# Patient Record
Sex: FEMALE | Race: WHITE | NOT HISPANIC OR LATINO | Employment: FULL TIME | URBAN - METROPOLITAN AREA
[De-identification: names, ages, dates, MRNs, and addresses within clinical notes are randomized per-mention and may not be internally consistent; named-entity substitution may affect disease eponyms.]

---

## 2022-07-18 ENCOUNTER — OFFICE VISIT (OUTPATIENT)
Dept: URGENT CARE | Facility: CLINIC | Age: 22
End: 2022-07-18
Payer: COMMERCIAL

## 2022-07-18 VITALS
TEMPERATURE: 97.9 F | SYSTOLIC BLOOD PRESSURE: 145 MMHG | HEART RATE: 102 BPM | WEIGHT: 135 LBS | WEIGHT: 135 LBS | BODY MASS INDEX: 21.69 KG/M2 | SYSTOLIC BLOOD PRESSURE: 145 MMHG | HEIGHT: 66 IN | HEIGHT: 66 IN | OXYGEN SATURATION: 99 % | BODY MASS INDEX: 21.69 KG/M2 | TEMPERATURE: 97.9 F | DIASTOLIC BLOOD PRESSURE: 103 MMHG | DIASTOLIC BLOOD PRESSURE: 103 MMHG | OXYGEN SATURATION: 99 % | HEART RATE: 102 BPM

## 2022-07-18 DIAGNOSIS — L30.9 ECZEMA, UNSPECIFIED TYPE: Primary | ICD-10-CM

## 2022-07-18 PROCEDURE — 99213 OFFICE O/P EST LOW 20 MIN: CPT | Performed by: EMERGENCY MEDICINE

## 2022-07-18 RX ORDER — PREDNISONE 10 MG/1
TABLET ORAL
Qty: 27 TABLET | Refills: 0 | Status: SHIPPED | OUTPATIENT
Start: 2022-07-18

## 2022-07-18 RX ORDER — LISDEXAMFETAMINE DIMESYLATE 40 MG/1
CAPSULE ORAL
COMMUNITY
Start: 2022-06-29

## 2022-07-18 NOTE — PROGRESS NOTES
[facility]        NAME: Yaneli Ruelas is a 25 y o  female  : 2000    MRN: [id number]  DATE: 2022  TIME: 6:07 PM    Assessment and Plan   Eczema, unspecified type [L30 9]  1  Eczema, unspecified type  predniSONE 10 mg tablet         Patient Instructions     Patient Instructions     Eczema   WHAT YOU NEED TO KNOW:   Eczema is an itchy, red skin rash  You are more likely to have it if your parent or a family member has eczema, asthma, or hay fever  Eczema is a long-term condition  You may have flare-ups from time to time for the rest of your life  DISCHARGE INSTRUCTIONS:   Return to the emergency department if:   · You develop a fever or have red streaks going up your arm or leg  · Your rash gets more swollen, red, or hot  Call your doctor if:   · Most of your skin is red, swollen, painful, and covered with scales  · You develop bloody, red, painful crusts  · Your skin blisters and oozes white or yellow pus  · You have questions or concerns about your condition or care  Medicines:   · Medicines may help reduce itching, redness, pain, and swelling  They may be given as a cream or pill  You may also receive antibiotics if you have a skin infection  · Take your medicine as directed  Contact your healthcare provider if you think your medicine is not helping or if you have side effects  Tell him or her if you are allergic to any medicine  Keep a list of the medicines, vitamins, and herbs you take  Include the amounts, and when and why you take them  Bring the list or the pill bottles to follow-up visits  Carry your medicine list with you in case of an emergency  Care for your skin:   · Do not scratch  Pat or press on your skin to relieve itching  Your symptoms will get worse if you scratch  Keep your fingernails short so you do not tear your skin if you do scratch  · Keep your skin moist   Rub lotion, cream, or ointment into your skin at least 2 times a day   Ask your healthcare provider what to use and how often to use it  · Take baths or showers  with warm water for 10 minutes or less  Use mild bar soap  Ask your healthcare provider for the best soap for you to use  · Wear cotton clothes  Wear loose-fitting clothes made from cotton or cotton blends  Avoid wool  · Use a humidifier  to add moisture to the air in your home  · Avoid changes in temperature , especially activities that cause you to sweat a lot  Sweat can cause itching  Remove blankets from your bed if you get hot while you sleep  · Avoid allergens, dust, and skin irritants  Do not use perfume, fabric softener, or makeup that burns or itches  Follow up with your doctor as directed:  Write down your questions so you remember to ask them during your visits  © [employer] 2022 Information is for End User's use only and may not be sold, redistributed or otherwise used for commercial purposes  All illustrations and images included in CareNotes® are the copyrighted property of [employer] D A M [employer]  or [facility] Niko Kapadia   The above information is an  only  It is not intended as medical advice for individual conditions or treatments  Talk to your doctor, nurse or pharmacist before following any medical regimen to see if it is safe and effective for you  Follow up with PCP in 3-5 days  Proceed to  ER if symptoms worsen  Chief Complaint     Chief Complaint   Patient presents with    Rash     Her Rash is spreading on her hands 1 week ago and now spreading to her face          History of Present Illness       Patient complains of eczema flare for the past week  History of eczema since childhood  No relief with oral antihistamines or corticosteroid creams  Review of Systems   Review of Systems   Constitutional: Negative for chills and fever  Respiratory: Negative for shortness of breath      Musculoskeletal: Negative for arthralgias, joint swelling, myalgias, neck pain and neck stiffness  Skin: Positive for color change and rash  Negative for wound  Neurological: Negative for dizziness and headaches  Current Medications       Current Outpatient Medications:     predniSONE 10 mg tablet, Take once daily all days pills on this schedule 6- 6- 5- 4- 3- 2- 1, Disp: 27 tablet, Rfl: 0    triamcinolone (KENALOG) 0 1 % ointment, Apply 1 application topically 2 (two) times a day To affected area, Disp: , Rfl:     Vyvanse 40 MG capsule, TAKE 1 CAPSULE BY MOUTH EVERY DAY IN THE MORNING, Disp: , Rfl:     Current Allergies     Allergies as of 07/18/2022    (No Known Allergies)            The following portions of the patient's history were reviewed and updated as appropriate: allergies, current medications, past family history, past medical history, past social history, past surgical history and problem list      History reviewed  No pertinent past medical history  History reviewed  No pertinent surgical history  History reviewed  No pertinent family history  Medications have been verified  Objective   BP (!) 145/103   Pulse 102   Temp 97 9 °F (36 6 °C)   Ht 5' 6" (1 676 m)   Wt 61 2 kg (135 lb)   LMP 06/06/2022   SpO2 99%   BMI 21 79 kg/m²        Physical Exam     Physical Exam  Vitals and nursing note reviewed  Constitutional:       General: She is not in acute distress  Appearance: She is well-developed  She is not ill-appearing  HENT:      Head: Normocephalic and atraumatic  Nose: Mucosal edema present  Mouth/Throat:      Pharynx: No oropharyngeal exudate or posterior oropharyngeal erythema  Tonsils: No tonsillar abscesses  Musculoskeletal:      Cervical back: Neck supple  Skin:     General: Skin is warm and dry  Findings: Erythema and rash present        Comments: Mildly erythematous fine papular rash of face with periorbital swelling bilaterally, flexural creases elbows and wrists, upper chest and anterior neck Neurological:      Mental Status: She is alert and oriented to person, place, and time  Psychiatric:         Mood and Affect: Mood normal          Behavior: Behavior normal          Thought Content:  Thought content normal          Judgment: Judgment normal

## 2022-07-18 NOTE — PATIENT INSTRUCTIONS
Eczema   WHAT YOU NEED TO KNOW:   Eczema is an itchy, red skin rash  You are more likely to have it if your parent or a family member has eczema, asthma, or hay fever  Eczema is a long-term condition  You may have flare-ups from time to time for the rest of your life  DISCHARGE INSTRUCTIONS:   Return to the emergency department if:   You develop a fever or have red streaks going up your arm or leg  Your rash gets more swollen, red, or hot  Call your doctor if:   Most of your skin is red, swollen, painful, and covered with scales  You develop bloody, red, painful crusts  Your skin blisters and oozes white or yellow pus  You have questions or concerns about your condition or care  Medicines:   Medicines may help reduce itching, redness, pain, and swelling  They may be given as a cream or pill  You may also receive antibiotics if you have a skin infection  Take your medicine as directed  Contact your healthcare provider if you think your medicine is not helping or if you have side effects  Tell him or her if you are allergic to any medicine  Keep a list of the medicines, vitamins, and herbs you take  Include the amounts, and when and why you take them  Bring the list or the pill bottles to follow-up visits  Carry your medicine list with you in case of an emergency  Care for your skin:   Do not scratch  Pat or press on your skin to relieve itching  Your symptoms will get worse if you scratch  Keep your fingernails short so you do not tear your skin if you do scratch  Keep your skin moist   Rub lotion, cream, or ointment into your skin at least 2 times a day  Ask your healthcare provider what to use and how often to use it  Take baths or showers  with warm water for 10 minutes or less  Use mild bar soap  Ask your healthcare provider for the best soap for you to use  Wear cotton clothes  Wear loose-fitting clothes made from cotton or cotton blends  Avoid wool      Use a humidifier to add moisture to the air in your home  Avoid changes in temperature , especially activities that cause you to sweat a lot  Sweat can cause itching  Remove blankets from your bed if you get hot while you sleep  Avoid allergens, dust, and skin irritants  Do not use perfume, fabric softener, or makeup that burns or itches  Follow up with your doctor as directed:  Write down your questions so you remember to ask them during your visits  © Copyright [employer] 2022 Information is for End User's use only and may not be sold, redistributed or otherwise used for commercial purposes  All illustrations and images included in CareNotes® are the copyrighted property of [employer]  or [facility] Niko Kapadia   The above information is an  only  It is not intended as medical advice for individual conditions or treatments  Talk to your doctor, nurse or pharmacist before following any medical regimen to see if it is safe and effective for you